# Patient Record
Sex: FEMALE | Race: WHITE | NOT HISPANIC OR LATINO | Employment: UNEMPLOYED | URBAN - METROPOLITAN AREA
[De-identification: names, ages, dates, MRNs, and addresses within clinical notes are randomized per-mention and may not be internally consistent; named-entity substitution may affect disease eponyms.]

---

## 2020-02-05 ENCOUNTER — TELEPHONE (OUTPATIENT)
Dept: OBSTETRICS AND GYNECOLOGY | Facility: CLINIC | Age: 25
End: 2020-02-05

## 2022-05-03 ENCOUNTER — TELEPHONE (OUTPATIENT)
Dept: OBSTETRICS AND GYNECOLOGY | Facility: CLINIC | Age: 27
End: 2022-05-03

## 2022-05-03 ENCOUNTER — OFFICE VISIT (OUTPATIENT)
Dept: OBSTETRICS AND GYNECOLOGY | Facility: CLINIC | Age: 27
End: 2022-05-03

## 2022-05-03 VITALS
SYSTOLIC BLOOD PRESSURE: 104 MMHG | WEIGHT: 181 LBS | DIASTOLIC BLOOD PRESSURE: 69 MMHG | BODY MASS INDEX: 30.9 KG/M2 | HEIGHT: 64 IN | HEART RATE: 77 BPM

## 2022-05-03 DIAGNOSIS — R10.2 PELVIC PAIN: Primary | ICD-10-CM

## 2022-05-03 DIAGNOSIS — D25.9 UTERINE LEIOMYOMA, UNSPECIFIED LOCATION: ICD-10-CM

## 2022-05-03 DIAGNOSIS — N93.9 ABNORMAL UTERINE BLEEDING (AUB): ICD-10-CM

## 2022-05-03 DIAGNOSIS — N83.209 CYST OF OVARY, UNSPECIFIED LATERALITY: ICD-10-CM

## 2022-05-03 LAB
B-HCG UR QL: NEGATIVE
BILIRUB BLD-MCNC: NEGATIVE MG/DL
CLARITY, POC: ABNORMAL
COLOR UR: YELLOW
EXPIRATION DATE: NORMAL
GLUCOSE UR STRIP-MCNC: NEGATIVE MG/DL
INTERNAL NEGATIVE CONTROL: NEGATIVE
INTERNAL POSITIVE CONTROL: POSITIVE
KETONES UR QL: NEGATIVE
LEUKOCYTE EST, POC: ABNORMAL
Lab: NORMAL
NITRITE UR-MCNC: POSITIVE MG/ML
PH UR: 6.5 [PH] (ref 5–8)
PROT UR STRIP-MCNC: NEGATIVE MG/DL
RBC # UR STRIP: ABNORMAL /UL
SP GR UR: 1.01 (ref 1–1.03)
UROBILINOGEN UR QL: NORMAL

## 2022-05-03 PROCEDURE — 81025 URINE PREGNANCY TEST: CPT | Performed by: OBSTETRICS & GYNECOLOGY

## 2022-05-03 PROCEDURE — 99204 OFFICE O/P NEW MOD 45 MIN: CPT | Performed by: OBSTETRICS & GYNECOLOGY

## 2022-05-03 RX ORDER — BLOOD-GLUCOSE METER
1 KIT MISCELLANEOUS 4 TIMES DAILY
COMMUNITY
Start: 2022-03-31

## 2022-05-03 RX ORDER — CETIRIZINE HYDROCHLORIDE 10 MG/1
10 TABLET ORAL DAILY
COMMUNITY
Start: 2022-04-01

## 2022-05-03 RX ORDER — NITROFURANTOIN 25; 75 MG/1; MG/1
100 CAPSULE ORAL 2 TIMES DAILY
Qty: 14 CAPSULE | Refills: 0 | Status: SHIPPED | OUTPATIENT
Start: 2022-05-03 | End: 2022-05-10

## 2022-05-03 RX ORDER — BUSPIRONE HYDROCHLORIDE 10 MG/1
TABLET ORAL
COMMUNITY
Start: 2022-04-30

## 2022-05-03 RX ORDER — PRAZOSIN HYDROCHLORIDE 1 MG/1
1 CAPSULE ORAL
COMMUNITY

## 2022-05-03 RX ORDER — LEVOTHYROXINE SODIUM 75 UG/1
75 TABLET ORAL DAILY
COMMUNITY
Start: 2022-03-29

## 2022-05-03 RX ORDER — HYDROXYZINE HYDROCHLORIDE 25 MG/1
TABLET, FILM COATED ORAL EVERY 24 HOURS
COMMUNITY

## 2022-05-03 RX ORDER — DIVALPROEX SODIUM 500 MG/1
TABLET, DELAYED RELEASE ORAL
COMMUNITY
Start: 2022-04-30

## 2022-05-03 RX ORDER — METOCLOPRAMIDE 10 MG/1
10 TABLET ORAL 4 TIMES DAILY
COMMUNITY
Start: 2021-11-21

## 2022-05-03 RX ORDER — VALACYCLOVIR HYDROCHLORIDE 500 MG/1
500 TABLET, FILM COATED ORAL DAILY
COMMUNITY
Start: 2022-03-31

## 2022-05-03 RX ORDER — INSULIN DEGLUDEC INJECTION 100 U/ML
INJECTION, SOLUTION SUBCUTANEOUS
COMMUNITY
Start: 2022-03-29

## 2022-05-03 RX ORDER — PEN NEEDLE, DIABETIC 31 GX5/16"
NEEDLE, DISPOSABLE MISCELLANEOUS
COMMUNITY
Start: 2022-03-31

## 2022-05-03 RX ORDER — LANCETS
EACH MISCELLANEOUS
COMMUNITY
Start: 2022-04-14

## 2022-05-03 RX ORDER — TIMOLOL MALEATE 5 MG/ML
SOLUTION/ DROPS OPHTHALMIC
COMMUNITY
Start: 2022-04-23

## 2022-05-03 RX ORDER — SPINOSAD 9 MG/ML
SUSPENSION TOPICAL
COMMUNITY
Start: 2022-02-03

## 2022-05-03 RX ORDER — INSULIN ASPART 100 [IU]/ML
INJECTION, SOLUTION INTRAVENOUS; SUBCUTANEOUS
COMMUNITY
Start: 2022-03-29

## 2022-05-03 RX ORDER — ERGOCALCIFEROL 1.25 MG/1
50000 CAPSULE ORAL WEEKLY
COMMUNITY
Start: 2022-03-29

## 2022-05-03 RX ORDER — PRAVASTATIN SODIUM 10 MG
10 TABLET ORAL NIGHTLY
COMMUNITY
Start: 2022-03-29

## 2022-05-03 RX ORDER — ZIPRASIDONE HYDROCHLORIDE 40 MG/1
CAPSULE ORAL
COMMUNITY

## 2022-05-03 NOTE — PROGRESS NOTES
"SUBJECTIVE:   Chief Complaint   Patient presents with   • Follow-up     Pt presents today for uterine fibroids, endometriosis, breast cancer, cyst on left ovary         Monik North is a 26 y.o.  who presents today with multiple concerns; she reports that she has fibroids and endometriosis as well as varying ovarian cysts.  She was seen for breast lumps and has imaging planned for Thursday; she is concerned it is breast cancer due to her family history.     For her fibroids, she reports an US at [place] showed that she had fibroids and endometriosis. Thinks this was 2-3 years ago  Was told she had an ovarian cyst at that time.  She c/o 3 months of bleeding and reports she is bleeding right now.  Passing what appears \"purple-ey, looks like placenta almost.\"  C/o pelvic pain in the left lower quadrant.  Last hgb was 12.2 on 21. She was on DMPA for contraception, but stopped about 5 months ago; she is sexually active, last was unprotected 3-4 weeks ago.  She is not interested in conception.  She has a PMH significant for T2DM and she is being worked up for \"brain cancer\" with Dr. Fraser, her endocrinologist.  Per patient she states her insurance does not allow her to see an oncologist, but she is trying to get in to see one.  She reports that her daughter is being worked up for lymphoma, she is being worked up for osteoporosis as well bc she has had multiple fractures  In review of her gynecologic history, she reports removal of an ovarian tumor \"the sie of a watermelon\" at age 13.  Reports it weight 52lbs. This as she recalls was done with Dr. Hidalgo at [facility].  Reports an \"ectopic\" that was \"4 months along\" in 2017 or 2018 which required surgery and blood transfusions at [place].  Pt states she had a >200lb weight loss from improving her diabetes control.        Past Medical History:   Diagnosis Date   • Herpes    • Ovarian cyst    • Pituitary adenoma (HCC)    • Pulmonary adenomatosis    • " "Recurrent pregnancy loss, antepartum condition or complication    Sees Dr. Houston Fraser for endocrinology - had normal Brain MRI in 2020 - appears to be waiting on pituitary scan  - for osteoporosis - cannot tell if DXA was done or how this was diagnosed  - had left ovary removed at age 13 with \"tumor the size of a watermelon\" - 52lbs - done with Dr. Gwen torres at [place]     Past Surgical History:   Procedure Laterality Date   •  SECTION     • D & C WITH SUCTION     • LAPAROSCOPIC CHOLECYSTECTOMY     • LAPAROSCOPIC LYSIS INTESTINAL ADHESIONS     • OVARIAN CYST SURGERY Right 2017    with oophorectomy     OB History    Para Term  AB Living   8 1   1 7 1   SAB IAB Ectopic Molar Multiple Live Births   7   0     1      # Outcome Date GA Lbr Popeye/2nd Weight Sex Delivery Anes PTL Lv   8 2017           7  10/25/14 35w0d   F CS-LTranv   AZAR   6 SAB            5 SAB            4 SAB            3 SAB            2 SAB            1 SAB             Reports that her pregnancy in  was supposed to be delivered with \"organs outside and Horne Syndrome\" but she was\" healed\" and did not need surgery until later in life  Social History     Tobacco Use   • Smoking status: Never Smoker   • Smokeless tobacco: Never Used   Vaping Use   • Vaping Use: Never used   Substance Use Topics   • Alcohol use: Never   • Drug use: Yes     Types: Marijuana     Family History   Problem Relation Age of Onset   • Diabetes Father    • Diabetes Mother    • Diabetes Sister    • Diabetes Daughter    • Breast cancer Maternal Grandmother 32        passed away when her mom was little   • Colon cancer Maternal Grandfather 67   • Uterine cancer Paternal Aunt 27   • Ovarian cancer Maternal Great-Grandmother 79     Current Outpatient Medications on File Prior to Visit   Medication Sig Dispense Refill   • Accu-Chek Softclix Lancets lancets USE 1 TO CHECK GLUCOSE 4 TIMES DAILY AS DIRECTED     • B-D ULTRAFINE III SHORT PEN 31G " X 8 MM misc USE 1 4 TIMES DAILY     • busPIRone (BUSPAR) 10 MG tablet      • cetirizine (zyrTEC) 10 MG tablet Take 10 mg by mouth Daily.     • divalproex (DEPAKOTE) 500 MG DR tablet      • Euthyrox 75 MCG tablet Take 75 mcg by mouth Daily.     • FREESTYLE LITE test strip 1 each by Other route 4 (Four) Times a Day. use 1 strip to check glucose 4 times daily     • glucagon (GLUCAGEN) 1 MG injection Inject 1 mg under the skin into the appropriate area as directed.     • hydrOXYzine (ATARAX) 25 MG tablet Daily.     • insulin aspart (NovoLOG FlexPen) 100 UNIT/ML solution pen-injector sc pen 1 UNIT 5 GRAMS OF CARBS AND 1 UNIT EVERY 50 BG OVER 150, APPROX 100 UNITS DAILY.     • metoclopramide (REGLAN) 10 MG tablet Take 10 mg by mouth 4 (Four) Times a Day.     • Natroba 0.9 % suspension APPLY ENOUGH TO COMPLETELY COVER DRY SCALP AND HAIR. LEAVE ON FOR 10 MINUTES THEN RINSE HAIR. REPEAT HAIR TREATMENT IN 7 DAYS     • pravastatin (PRAVACHOL) 10 MG tablet Take 10 mg by mouth Every Night.     • prazosin (MINIPRESS) 1 MG capsule Take 1 capsule by mouth.     • timolol (TIMOPTIC) 0.5 % ophthalmic solution INSTILL 1 DROP INTO EACH EYE TWICE DAILY     • Tresiba FlexTouch 100 UNIT/ML solution pen-injector injection INJECT 30 UNITS SUBCUTANEOUSLY ONCE DAILY     • valACYclovir (VALTREX) 500 MG tablet Take 500 mg by mouth Daily.     • vitamin D (ERGOCALCIFEROL) 1.25 MG (21150 UT) capsule capsule Take 50,000 Units by mouth 1 (One) Time Per Week.     • ziprasidone (GEODON) 40 MG capsule Take  by mouth.     • insulin lispro (humaLOG) 100 UNIT/ML injection Inject  under the skin into the appropriate area as directed.       No current facility-administered medications on file prior to visit.     Allergies   Allergen Reactions   • Cephalexin Hives   • Lakisha Other (See Comments)   • Insulin Isophane GI Intolerance     Causes vomiting and fever   • Insulin Zinc Suspension GI Intolerance   • Melatonin Other (See Comments)     Spikes blood sugar  "  • Metformin Nausea And Vomiting   • Oxcarbazepine Other (See Comments)   • Penicillins Other (See Comments)        Review of Systems      OBJECTIVE:   Vitals:    05/03/22 1435   BP: 104/69   Pulse: 77   Weight: 82.1 kg (181 lb)   Height: 162.6 cm (64\")      Physical Exam  Exam conducted with a chaperone present.   Constitutional:       Appearance: She is well-developed.   HENT:      Head: Normocephalic and atraumatic.   Eyes:      General: No scleral icterus.  Cardiovascular:      Rate and Rhythm: Normal rate.   Pulmonary:      Effort: Pulmonary effort is normal. No respiratory distress.   Abdominal:      General: There is no distension.      Palpations: Abdomen is soft.      Tenderness: There is no abdominal tenderness. There is no guarding.   Genitourinary:     General: Normal vulva.      Labia:         Right: No rash, tenderness or lesion.         Left: No rash, tenderness or lesion.       Vagina: No bleeding or lesions.      Cervix: No cervical motion tenderness, friability or cervical bleeding.      Uterus: Normal. Not enlarged and not tender.       Adnexa: Right adnexa normal.        Right: No mass or tenderness.          Left: No mass or tenderness.     Musculoskeletal:      Cervical back: Normal range of motion.   Skin:     General: Skin is warm and dry.   Neurological:      Mental Status: She is alert and oriented to person, place, and time.   Psychiatric:         Behavior: Behavior normal.         ASSESSMENT/PLAN:     ICD-10-CM ICD-9-CM   1. Pelvic pain  R10.2 [id number]   2. Abnormal uterine bleeding (AUB)  N93.9 626.9   3. Uterine leiomyoma, unspecified location  D25.9 218.9   4. Cyst of ovary, unspecified laterality  N83.209 620.2     Patient's report of her medical history is contradicted by review of the chart.   Pertinent GYN records including a normal CT scan from Dec 2021 that shows no evidence of fibroids.  I reviewed with her that we should repeat an US (last was in 2020 and also showed no " fibroids) to evaluate for fibroids/ovarian cysts. We reviewed the limitations of US in detection of endometriosis.  AUB: pt has no bleeding on exam, cervix is closed. Pap and swab sent. UPT negative. Reviewed contraception as she is overdue for DMPA per her report.  Encouraged condoms if no contraceptive option chosen.  As for her surgical history, I cannot find record of ovarian surgery at age 14yo.  It does appear that in 2017 she had a D&C, but I do not see a report of ectopic or transfusion.  Her pregnancy was complicated by CPAM.      Encouraged to follow up for breast imaging as scheduled    Orders Placed This Encounter   Procedures   • NuSwab VG+ - Swab, Vagina     Order Specific Question:   Release to patient     Answer:   Immediate   • POC Urinalysis Dipstick     Order Specific Question:   Release to patient     Answer:   Immediate   • POC Pregnancy, Urine     Order Specific Question:   Release to patient     Answer:   Immediate       Return in about 2 weeks (around 5/17/2022) for GYN follow up and GYN US.

## 2022-05-04 NOTE — TELEPHONE ENCOUNTER
Please let patient know it appears she has a UTI which may be causing some of her pelvic pain. I sent a prescription for antibiotics to her pharmacy. Recommend calling office if she cannot tolerate antibiotics or seeking further evaluation with a fever (temp of 100.4 or higher), severe back pain, vomiting or other concerning symptom.

## 2022-05-05 LAB
A VAGINAE DNA VAG QL NAA+PROBE: ABNORMAL SCORE
BVAB2 DNA VAG QL NAA+PROBE: ABNORMAL SCORE
C ALBICANS DNA VAG QL NAA+PROBE: NEGATIVE
C GLABRATA DNA VAG QL NAA+PROBE: NEGATIVE
C TRACH DNA VAG QL NAA+PROBE: POSITIVE
MEGA1 DNA VAG QL NAA+PROBE: ABNORMAL SCORE
N GONORRHOEA DNA VAG QL NAA+PROBE: NEGATIVE
T VAGINALIS DNA VAG QL NAA+PROBE: POSITIVE

## 2022-05-06 ENCOUNTER — TELEPHONE (OUTPATIENT)
Dept: OBSTETRICS AND GYNECOLOGY | Facility: CLINIC | Age: 27
End: 2022-05-06

## 2022-05-06 RX ORDER — DOXYCYCLINE HYCLATE 100 MG/1
100 CAPSULE ORAL 2 TIMES DAILY
Qty: 14 CAPSULE | Refills: 0 | Status: SHIPPED | OUTPATIENT
Start: 2022-05-06 | End: 2022-05-13

## 2022-05-06 RX ORDER — METRONIDAZOLE 500 MG/1
500 TABLET ORAL 2 TIMES DAILY
Qty: 14 TABLET | Refills: 0 | Status: SHIPPED | OUTPATIENT
Start: 2022-05-06 | End: 2022-05-13

## 2022-05-06 NOTE — TELEPHONE ENCOUNTER
Reviewed with patient that she chlamydia and trichomonas, two STIs.  She will start doxycycline and metronidazole.  She was counselled that sexual partner(s) should be tested and treated and refrain from intercourse for at least 7 days after both she and her partner(s) have been treated.  Positive tests are important to treat, because these infections can lead to pelvic inflammatory disease (PID) and/or tubal scarring.  It is important to let us know if she is unable to take the medication or have an episode of vomiting after taking the medication.  Also, let us know if she has abdominal pain, fever, or other concerning signs or symptoms.  Pt aware of above.  She was provided info regarding testing facilities for her partner(s)

## 2022-05-09 LAB
CONV .: NORMAL
CYTOLOGIST CVX/VAG CYTO: NORMAL
CYTOLOGY CVX/VAG DOC CYTO: NORMAL
CYTOLOGY CVX/VAG DOC THIN PREP: NORMAL
DX ICD CODE: NORMAL
HIV 1 & 2 AB SER-IMP: NORMAL
OTHER STN SPEC: NORMAL
STAT OF ADQ CVX/VAG CYTO-IMP: NORMAL

## 2022-05-20 ENCOUNTER — OFFICE VISIT (OUTPATIENT)
Dept: OBSTETRICS AND GYNECOLOGY | Facility: CLINIC | Age: 27
End: 2022-05-20

## 2022-05-20 VITALS
DIASTOLIC BLOOD PRESSURE: 65 MMHG | WEIGHT: 185 LBS | HEART RATE: 101 BPM | HEIGHT: 64 IN | SYSTOLIC BLOOD PRESSURE: 101 MMHG | BODY MASS INDEX: 31.58 KG/M2

## 2022-05-20 DIAGNOSIS — N93.9 ABNORMAL UTERINE BLEEDING (AUB): ICD-10-CM

## 2022-05-20 DIAGNOSIS — R10.2 PELVIC PAIN: Primary | ICD-10-CM

## 2022-05-20 PROCEDURE — 99213 OFFICE O/P EST LOW 20 MIN: CPT | Performed by: OBSTETRICS & GYNECOLOGY

## 2022-05-20 RX ORDER — PROCHLORPERAZINE 25 MG/1
SUPPOSITORY RECTAL
COMMUNITY
Start: 2022-05-03

## 2022-05-20 RX ORDER — PERMETHRIN 50 MG/G
CREAM TOPICAL
COMMUNITY
Start: 2022-05-17

## 2022-05-20 RX ORDER — PROCHLORPERAZINE 25 MG/1
SUPPOSITORY RECTAL SEE ADMIN INSTRUCTIONS
COMMUNITY
Start: 2022-05-03

## 2022-05-20 RX ORDER — DIVALPROEX SODIUM 500 MG/1
TABLET, DELAYED RELEASE ORAL
COMMUNITY
Start: 2022-02-25 | End: 2022-05-25

## 2022-05-20 NOTE — PROGRESS NOTES
SUBJECTIVE:   Chief Complaint   Patient presents with   • Follow-up     Pt presents today to go over u/s results         Monik North is a 26 y.o.  who presents for follow up from pelvic pain, possible fibroids and endometriosis. She reports that she took treatment for STIs, but her partner has not yet been treated/tested. She has not been sexually active since treatment.     Past Medical History:   Diagnosis Date   • Herpes    • Ovarian cyst    • Pituitary adenoma (HCC)    • Pulmonary adenomatosis    • Recurrent pregnancy loss, antepartum condition or complication      Past Surgical History:   Procedure Laterality Date   •  SECTION     • D & C WITH SUCTION     • LAPAROSCOPIC CHOLECYSTECTOMY     • LAPAROSCOPIC LYSIS INTESTINAL ADHESIONS     • OVARIAN CYST SURGERY Right 2017    with oophorectomy     OB History    Para Term  AB Living   8 1   1 7 1   SAB IAB Ectopic Molar Multiple Live Births   7   0     1      # Outcome Date GA Lbr Popeye/2nd Weight Sex Delivery Anes PTL Lv   8 2017           7  10/25/14 35w0d   F CS-LTranv   AZAR   6 SAB            5 SAB            4 SAB            3 SAB            2 SAB            1 SAB               Social History     Tobacco Use   • Smoking status: Never Smoker   • Smokeless tobacco: Never Used   Vaping Use   • Vaping Use: Never used   Substance Use Topics   • Alcohol use: Never   • Drug use: Yes     Types: Marijuana     Family History   Problem Relation Age of Onset   • Diabetes Father    • Diabetes Mother    • Diabetes Sister    • Diabetes Daughter    • Breast cancer Maternal Grandmother 32        passed away when her mom was little   • Colon cancer Maternal Grandfather 67   • Uterine cancer Paternal Aunt 27   • Ovarian cancer Maternal Great-Grandmother 79     Current Outpatient Medications on File Prior to Visit   Medication Sig Dispense Refill   • Accu-Chek Softclix Lancets lancets USE 1 TO CHECK GLUCOSE 4 TIMES DAILY AS DIRECTED      • B-D ULTRAFINE III SHORT PEN 31G X 8 MM misc USE 1 4 TIMES DAILY     • busPIRone (BUSPAR) 10 MG tablet      • cetirizine (zyrTEC) 10 MG tablet Take 10 mg by mouth Daily.     • Continuous Blood Gluc  (Dexcom G6 ) device See Admin Instructions.     • Continuous Blood Gluc Sensor (Dexcom G6 Sensor) APPLY TOPICALLY AND CHANGE EVERY 10 DAYS     • Continuous Blood Gluc Transmit (Dexcom G6 Transmitter) misc See Admin Instructions.     • divalproex (DEPAKOTE) 500 MG DR tablet      • divalproex (DEPAKOTE) 500 MG DR tablet Take  by mouth.     • Euthyrox 75 MCG tablet Take 75 mcg by mouth Daily.     • FREESTYLE LITE test strip 1 each by Other route 4 (Four) Times a Day. use 1 strip to check glucose 4 times daily     • glucagon (GLUCAGEN) 1 MG injection Inject 1 mg under the skin into the appropriate area as directed.     • hydrOXYzine (ATARAX) 25 MG tablet Daily.     • insulin aspart (NovoLOG FlexPen) 100 UNIT/ML solution pen-injector sc pen 1 UNIT 5 GRAMS OF CARBS AND 1 UNIT EVERY 50 BG OVER 150, APPROX 100 UNITS DAILY.     • insulin lispro (humaLOG) 100 UNIT/ML injection Inject  under the skin into the appropriate area as directed.     • metoclopramide (REGLAN) 10 MG tablet Take 10 mg by mouth 4 (Four) Times a Day.     • Natroba 0.9 % suspension APPLY ENOUGH TO COMPLETELY COVER DRY SCALP AND HAIR. LEAVE ON FOR 10 MINUTES THEN RINSE HAIR. REPEAT HAIR TREATMENT IN 7 DAYS     • permethrin (ELIMITE) 5 % cream APPLY CREAM EXTERNALLY FROM HEAD TO TOE. LEAVE ON FOR 8-14 HOURS BEFORE WASHING OFF WITH WATER. REAPPLT IN 1 WEEK IF LIVE MITES APPEAR     • pravastatin (PRAVACHOL) 10 MG tablet Take 10 mg by mouth Every Night.     • prazosin (MINIPRESS) 1 MG capsule Take 1 capsule by mouth.     • timolol (TIMOPTIC) 0.5 % ophthalmic solution INSTILL 1 DROP INTO EACH EYE TWICE DAILY     • Tresiba FlexTouch 100 UNIT/ML solution pen-injector injection INJECT 30 UNITS SUBCUTANEOUSLY ONCE DAILY     • valACYclovir (VALTREX) 500  "MG tablet Take 500 mg by mouth Daily.     • vitamin D (ERGOCALCIFEROL) 1.25 MG (25698 UT) capsule capsule Take 50,000 Units by mouth 1 (One) Time Per Week.     • ziprasidone (GEODON) 40 MG capsule Take  by mouth.       No current facility-administered medications on file prior to visit.     Allergies   Allergen Reactions   • Cephalexin Hives   • Lakisha Other (See Comments)   • Insulin Isophane GI Intolerance     Causes vomiting and fever   • Insulin Zinc Suspension GI Intolerance   • Melatonin Other (See Comments)     Spikes blood sugar   • Metformin Nausea And Vomiting   • Oxcarbazepine Other (See Comments)   • Penicillins Other (See Comments)        Review of Systems      OBJECTIVE:   Vitals:    05/20/22 1050   BP: 101/65   Pulse: 101   Weight: 83.9 kg (185 lb)   Height: 162.6 cm (64.02\")      Physical Exam  Constitutional:       General: She is not in acute distress.     Appearance: She is well-developed. She is not diaphoretic.   HENT:      Head: Normocephalic and atraumatic.   Eyes:      General: No scleral icterus.     Extraocular Movements: Extraocular movements intact.   Pulmonary:      Effort: Pulmonary effort is normal. No respiratory distress.   Skin:     General: Skin is warm and dry.   Neurological:      General: No focal deficit present.      Mental Status: She is alert and oriented to person, place, and time.   Psychiatric:         Mood and Affect: Mood normal.         Behavior: Behavior normal.         Thought Content: Thought content normal.         Judgment: Judgment normal.         ASSESSMENT/PLAN:     ICD-10-CM ICD-9-CM   1. Pelvic pain  R10.2 [id number]   2. Abnormal uterine bleeding (AUB)  N93.9 626.9       Reviewed with patient that her ultrasound showed no evidence of fibroids or uterine pathology.  We discussed that endometriosis can be difficult to discern based on ultrasound alone.   At the time of ultrasound, we only saw her right ovary but this can be in part due to bowel shadowing. She had " "an ultrasound in Sept 2020 that showed both ovaries so during her prior reported surgery I suspect she had either a cystectomy, partial oophorectomy or removal of paratubal cyst.      For her pelvic pain, discussed repeating testing in 3 months per CDC test of reinfection guidelines. Encouraged safe sex  Reviewed that there are medications which she takes that would not be recommended in pregnancy, notably depakote.  Reviewed contraceptive options.  She has been on DMPA in the past but does not want to come the office that much to get it. She does not want a Nexplanon because someone she knows had an infection after getting it.  She does not want an IUD \"because I don't want something that lasts years.\" We discussed that they are able to be removed at any time. She will consider her options and let us know if she desires contraception.     No orders of the defined types were placed in this encounter.      Return in about 3 months (around 8/20/2022) for Recheck.            "

## 2022-08-25 ENCOUNTER — TELEPHONE (OUTPATIENT)
Dept: OBSTETRICS AND GYNECOLOGY | Facility: CLINIC | Age: 27
End: 2022-08-25

## 2024-01-08 ENCOUNTER — TELEPHONE (OUTPATIENT)
Dept: OBSTETRICS AND GYNECOLOGY | Facility: CLINIC | Age: 29
End: 2024-01-08
Payer: COMMERCIAL

## 2024-01-08 NOTE — TELEPHONE ENCOUNTER
Pt states she was seen at  for abscess on ovary that is now forming a whole. Pt states she was advised to get in right away within the next two weeks. Please advise on if you would like pt to be added on with you within the next two weeks    Thank you!

## 2024-01-09 NOTE — TELEPHONE ENCOUNTER
I can see her at [facility] tomorrow (1/10) at 12:45 or you can use any available opening in the next week. Thanks!

## 2024-01-16 ENCOUNTER — TELEPHONE (OUTPATIENT)
Dept: OBSTETRICS AND GYNECOLOGY | Facility: CLINIC | Age: 29
End: 2024-01-16

## 2024-01-16 NOTE — TELEPHONE ENCOUNTER
Called about missed appt today. Rhianna is concerned about poss ovarian abscess that needs to be scanned.